# Patient Record
Sex: FEMALE | Race: OTHER | Employment: OTHER | URBAN - METROPOLITAN AREA
[De-identification: names, ages, dates, MRNs, and addresses within clinical notes are randomized per-mention and may not be internally consistent; named-entity substitution may affect disease eponyms.]

---

## 2021-08-16 ENCOUNTER — APPOINTMENT (OUTPATIENT)
Dept: PHYSICAL THERAPY | Age: 46
End: 2021-08-16

## 2021-08-18 ENCOUNTER — APPOINTMENT (OUTPATIENT)
Dept: PHYSICAL THERAPY | Age: 46
End: 2021-08-18

## 2021-08-30 ENCOUNTER — HOSPITAL ENCOUNTER (OUTPATIENT)
Dept: PHYSICAL THERAPY | Age: 46
Discharge: HOME | End: 2021-08-30
Payer: COMMERCIAL

## 2021-08-30 PROCEDURE — 97110 THERAPEUTIC EXERCISES: CPT

## 2021-08-30 PROCEDURE — 97014 ELECTRIC STIMULATION THERAPY: CPT

## 2021-08-30 PROCEDURE — 97162 PT EVAL MOD COMPLEX 30 MIN: CPT

## 2021-08-30 NOTE — PROGRESS NOTES
PHYSICAL THERAPY - DAILY TREATMENT NOTE    Patient Name: Jennifer Garcia        Date: 2021  : 1975   yes Patient  Verified  Visit #:   1     Insurance: Payor: Billy Drivers / Plan: Michael Finn HMO / Product Type: HMO /      In time: 11:31 Out time: 12:39   Total Treatment Time: 68     Medicare/[facility] Time Tracking (below)   Total Timed Codes (min):  na 1:1 Treatment Time:  na     TREATMENT AREA =  Low back pain [M54.5]    SUBJECTIVE  Pain Level (on 0 to 10 scale):  4  / 10   Medication Changes/New allergies or changes in medical history, any new surgeries or procedures?    no  If yes, update Summary List   Subjective Functional Status/Changes:  []  No changes reported     See POC          OBJECTIVE  Modalities Rationale:     decrease inflammation and decrease pain to improve patient's ability to complete ADLs, transfers  10 min [x] Estim, type/location: IFC to lumbosacral junction in supine w/ bolster                                     []  att     [x]  unatt     []  w/US     []  w/ice    []  w/heat    min []  Mechanical Traction: type/lbs                   []  pro   []  sup   []  int   []  cont    []  before manual    []  after manual    min []  Ultrasound, settings/location:      min []  Iontophoresis w/ dexamethasone, location:                                               []  take home patch       []  in clinic    min []  Ice     []  Heat    location/position:     min []  Vasopneumatic Device, press/temp:    If using vaso (only need to measure limb vaso being performed on)      pre-treatment girth :       post-treatment girth :       measured at (landmark location) :      min []  Other:    [] Skin assessment post-treatment (if applicable):    []  intact    []  redness- no adverse reaction                  []redness  adverse reaction:      8 min Therapeutic Exercise:  [x]  See flow sheet   Rationale:      increase ROM and increase strength to improve the patients ability to ambulate, transfer Billed With/As:   [x] TE   [] TA   [] Neuro   [] Self Care Patient Education: [x] Review HEP    [] Progressed/Changed HEP based on:   [] positioning   [] body mechanics   [] transfers   [] heat/ice application    [] other:      Other Objective/Functional Measures:    See POC     Post Treatment Pain Level (on 0 to 10) scale:   6  / 10     ASSESSMENT  Assessment/Changes in Function:     Pt reported inc pain following objective portion of evaluation (8/10); trialed IFC to to lumbosacral junction for pain modulation, which brought patients pain down 2 points on VAS. Will attempt to obtain home unit for long-term pain modulation. []  See Progress Note/Recertification   Patient will continue to benefit from skilled PT services to modify and progress therapeutic interventions, address functional mobility deficits, address ROM deficits, address strength deficits, analyze and address soft tissue restrictions, analyze and cue movement patterns, analyze and modify body mechanics/ergonomics, assess and modify postural abnormalities and instruct in home and community integration to attain remaining goals.    Progress toward goals / Updated goals:    See POC     PLAN  []  Upgrade activities as tolerated yes Continue plan of care   []  Discharge due to :    []  Other:      Therapist: Roland Shah PT    Date: 8/30/2021 Time: 11:04 AM     Future Appointments   Date Time Provider Michael Raymond   8/30/2021 11:30 AM Sean Cardenas PT [facility]

## 2021-08-30 NOTE — PROGRESS NOTES
[id number] AT [address], [address] [place] [address] Road  Phone: [phone]   [phone]  PLAN OF CARE / [address] [place] [address] PHYSICAL THERAPY SERVICES  Patient Name: Radha Mccurdy : 1975   Medical   Diagnosis: S/p L5/S1 ALIF Treatment Diagnosis: Low back pain [M54.5]   Onset Date: DOS: 2021     Referral Source: Berhane Hammer MD Start of Care [facility]): 2021   Prior Hospitalization: See medical history Provider #: [id number]   Prior Level of Function: 2 story home (15 steps to bedroom); previously able to walk unlimited, play softball   Comorbidities: DM, B PN;  x 2, hysterectomy; hyperthyroid, cholecystectomy; sleep dysfunction, depression 2   Medications: Verified on Patient Summary List   The Plan of Care and following information is based on the information from the initial evaluation.   ===========================================================================================  Assessment / key information: Pt is a 55 y/o F who presents to PT w/ c/o low back pain/stiffness s/p L5/S1 ALIF in 2021. Pt notes 2 year history of LBP which she relates [place]. Pt reports she eventually saw spine surgeon, who advised surgical intervention. Pt notes pre-operatively she experienced L thigh sx, which she reports he resolved except for when sleeping at night. Pt reports post-op course was complicated due to sepsis and was bedridden x 7 days. Reports she has not had any PT to date. Was rx back brace but was unable to wear 2' wound-vac x 9 weeks. Pt notes pain ranges 6 to 9/10, made worse with movement; better with icy-hot ointment and laying supine or on the R side. Describes pain as constant stiffness and achiness, and intermittent sharpness. Pt also endorses deconditioning/endurance. Functional limitations include difficulty cleaning/house work, walking up stairs, walking > 100 yards.  Prior treatment has included injections which she reports provided no relief. Red flags negative. FOTO 41/100. Clinical Exam Findings:  POSTURE/OBSERVATION: absent lumbar lordosis in stand. Scarred, longitudinal incision on L lower abdomen. FUNCTIONAL ASSESSMENT: gait dec trunk rotation, otherwise unrmerkable; sit<>stand slowed to ascend and inc time to stand upright,; stairs NR with B HR (ascends leading R, descends leading L); heel walk/toe walk normal; squat normal; SLS R 30\"/L 30\" w/ inc ankle strategy; bed mobility independent with good mechanics supine<>sit; bridge 75% AROM. ROM: lumbar AROM flex to distal thigh p!, ext minimal curve reversal p!, R SB distal thigh, L SB GT p!, RR 50%, LR 10% p!; hip ER R 35/L 30 p!, hip IR R 26/L 25 p!, hip flex R 105/L 95 p!, hip ext R 6/L 0 p! STRENGTH: ankle DF R 5/5/L 4+/5; EV R 5/5/L 4+/5; PF R 5/5/L 5/5; knee flex R 5/5/L 4-/5 p!; knee ext R 5/5/L 4-/5p!; hip flex R 5/5/ L 4/5 p!; abd R 5/5/L 4/5; ER R 5/5/ L 4+/5, IR R 4/5/L 4/5; ext R 4-/5/L 3+/5 p!; TA isometric poor. PALPATION: hyposensitivity noted to L L3 dermatome. TTP to L>R piriformis, glute max/med, L4/5 spinous process, L sacral base. SPECIAL TEST: (-) slump, (-) SLR. (-) diastasis recti testing. Pt presents w/ sx suggesting/consistent with lumbago and deconditioning s/p lumbar ALIF. Pt could benefit from PT to address impairments and functional limitations in order to improve pt's ability to complete ADLs.  Due to chronicity of condition, patient would benefit from home e-stim unit for pain modulation to improve activity tolerance and decrease reliance on pain medication.  ===========================================================================================  Eval Complexity: History MEDIUM  Complexity : 1-2 comorbidities / personal factors will impact the outcome/ POC ;  Examination  MEDIUM Complexity : 3 Standardized tests and measures addressing body structure, function, activity limitation and / or participation in recreation ; Presentation MEDIUM Complexity : Evolving with changing characteristics ; Decision Making MEDIUM Complexity : FOTO score of 26-74; Overall Complexity MEDIUM  Problem List: pain affecting function, decrease ROM, decrease strength, impaired gait/ balance, decrease ADL/ functional abilitiies, decrease activity tolerance, decrease flexibility/ joint mobility and decrease transfer abilities   Treatment Plan may include any combination of the following: Therapeutic exercise, Therapeutic activities, Neuromuscular re-education, Physical agent/modality, Gait/balance training, Manual therapy, Patient education, Self Care training, Functional mobility training, Home safety training and Stair training  Patient / Family readiness to learn indicated by: asking questions, trying to perform skills and interest  Persons(s) to be included in education: patient (P)  Barriers to Learning/Limitations: None  Measures taken, if barriers to learning:    Patient Goal (s): \"to gain my motion of movement back and with no pain or discomfort\"   Patient self reported health status: good  Rehabilitation Potential: good   Short Term Goals: To be accomplished in  1 weeks:  1. Pt will be I and compliant with HEP for self management of sx  2. Pt will demo good TA engagement without compensation to improve axial stability for ADLs   Long Term Goals: To be accomplished in  4-6  weeks:  1. Improve lumbar flexion AROM to at least mid shin to improve ease of transfers, dressing  2. Improve L quad/HS strength to at least 4/5 to improve ability to negotiate stairs w/ reciprocal pattern  3. Dec max pain </= 4/10 to improve activity tolerance  4. Improve FOTO score to >/= 53/100 to indicate improved function    Frequency / Duration:   Patient to be seen  2  times per week for 4-6  weeks:   All LTG as above will be assessed and updated every 10 visits or 30 days and progressed as needed    Patient / Caregiver education and instruction: activity modification and exercises  Therapist Signature: [facility],  Date: 1/27/5088   Certification Period: na Time: 11:04 AM   ===========================================================================================  I certify that the above Physical Therapy Services are being furnished while the patient is under my care. I agree with the treatment plan and certify that this therapy is necessary. Physician Signature:        Date:       Time:                         Juli Olivo MD  Please sign and return to [facility] or you may fax the signed copy to [phone]. Thank you.

## 2021-08-31 ENCOUNTER — HOSPITAL ENCOUNTER (OUTPATIENT)
Dept: PHYSICAL THERAPY | Age: 46
Discharge: HOME | End: 2021-08-31
Payer: COMMERCIAL

## 2021-08-31 PROCEDURE — 97110 THERAPEUTIC EXERCISES: CPT

## 2021-08-31 PROCEDURE — 97014 ELECTRIC STIMULATION THERAPY: CPT

## 2021-08-31 NOTE — PROGRESS NOTES
PHYSICAL THERAPY - DAILY TREATMENT NOTE    Patient Name: Yimi Ludwig        Date: 2021  : 1975   yes Patient  Verified  Visit #:   2   of     Insurance: Payor: Marion Enrique / Plan: Cherrie Jessica HMO / Product Type: HMO /      In time: 2:00 Out time: 3:10   Total Treatment Time: 70     Medicare/[employer] Time Tracking (below)   Total Timed Codes (min):   1:1 Treatment Time:       TREATMENT AREA =  Low back pain [M54.5]    SUBJECTIVE  Pain Level (on 0 to 10 scale):  6  / 10   Medication Changes/New allergies or changes in medical history, any new surgeries or procedures?    no  If yes, update Summary List   Subjective Functional Status/Changes:  []  No changes reported   I usually feel most of the pain in the lower left side of my back and my left leg feels a lot weaker than my right leg.            OBJECTIVE  Modalities Rationale:     decrease inflammation and decrease pain to improve patient's ability to improve functional abilities   10 min [x] Estim, type/location: IFC/L lumbar quadrant                                      []  att     []  unatt     []  w/US     []  w/ice    []  w/heat    min []  Mechanical Traction: type/lbs                   []  pro   []  sup   []  int   []  cont    []  before manual    []  after manual    min []  Ultrasound, settings/location:      min []  Iontophoresis w/ dexamethasone, location:                                               []  take home patch       []  in clinic    min []  Ice     []  Heat    location/position:     min []  Vasopneumatic Device, press/temp:    If using vaso (only need to measure limb vaso being performed on)      pre-treatment girth :       post-treatment girth :       measured at (landmark location) :      min []  Other:    [] Skin assessment post-treatment (if applicable):    []  intact    []  redness- no adverse reaction                  []redness  adverse reaction:      60 min Therapeutic Exercise:  [x]  See flow sheet   Rationale:      increase ROM, increase strength, improve balance and increase proprioception to improve the patients ability to improve functional abilities        Billed With/As:   [] TE   [] TA   [] Neuro   [] Self Care Patient Education: [x] Review HEP    [] Progressed/Changed HEP based on:   [] positioning   [] body mechanics   [] transfers   [] heat/ice application    [] other:      Other Objective/Functional Measures:       Post Treatment Pain Level (on 0 to 10) scale:   6 / 10     ASSESSMENT  Assessment/Changes in Function:   Pt presented with chief c/o L>R lumbar quadrant pain/symptoms as well as decreased L LE strength/endurance. Pt was challenged the most with L LE strength and endurance with SLR's and sidelying clamshells even with limited repetitions. Will continue to progress/advance patient within current POC as tolerated with monitoring symptoms. []  See Progress Note/Recertification   Patient will continue to benefit from skilled PT services to modify and progress therapeutic interventions, address functional mobility deficits, address ROM deficits, address strength deficits, analyze and address soft tissue restrictions, analyze and cue movement patterns, analyze and modify body mechanics/ergonomics, assess and modify postural abnormalities and instruct in home and community integration to attain remaining goals. Progress toward goals / Updated goals: · Short Term Goals: To be accomplished in  1 weeks:  1. Pt will be I and compliant with HEP for self management of sx: 8/31/21: Met  2. Pt will demo good TA engagement without compensation to improve axial stability for ADLs  · Long Term Goals: To be accomplished in  4-6  weeks:  1. Improve lumbar flexion AROM to at least mid shin to improve ease of transfers, dressing  2. Improve L quad/HS strength to at least 4/5 to improve ability to negotiate stairs w/ reciprocal pattern  3. Dec max pain </= 4/10 to improve activity tolerance  4.  Improve FOTO score to >/= 53/100 to indicate improved function        PLAN  [x]  Upgrade activities as tolerated yes Continue plan of care   []  Discharge due to :    []  Other:      Therapist: Conrad Guzman PTA    Date: 8/31/2021 Time: 2:12 PM     Future Appointments   Date Time Provider Michael Raymond   9/2/2021  2:45 PM Kenji Lacy [facility]   9/16/2021  2:00 PM Jolene Cruz [facility]   9/20/2021  1:00 PM Bautista Meigs, PTA [facility]   9/22/2021  1:30 PM Kenji Lacy [facility]   9/27/2021  1:00 PM Bautista Meigs, PTA [facility]   9/29/2021  1:30 PM Christian Dyke [address] [facility]   10/4/2021  1:00 PM Bautista Meigs, PTA [address] [facility]   10/6/2021  1:30 PM Christian Dyke [address] [facility]   10/11/2021  1:00 PM Bautista Meigs, PTA [facility]   10/13/2021  1:30 PM Christian Dyke [facility]

## 2021-09-02 ENCOUNTER — HOSPITAL ENCOUNTER (OUTPATIENT)
Dept: PHYSICAL THERAPY | Age: 46
Discharge: HOME | End: 2021-09-02
Payer: COMMERCIAL

## 2021-09-02 PROCEDURE — 97110 THERAPEUTIC EXERCISES: CPT

## 2021-09-02 PROCEDURE — 97014 ELECTRIC STIMULATION THERAPY: CPT

## 2021-09-02 NOTE — PROGRESS NOTES
PHYSICAL THERAPY - DAILY TREATMENT NOTE    Patient Name: Barbara Harris        Date: 2021  : 1975   yes Patient  Verified  Visit #:   3   of     Insurance: Payor: Curt Cline / Plan: [employer] Villareal HMO / Product Type: HMO /      In time: 245p Out time: 346p   Total Treatment Time: 61     Medicare/[employer] Time Tracking (below)   Total Timed Codes (min):  46 1:1 Treatment Time:  46     TREATMENT AREA =  Low back pain [M54.5]    SUBJECTIVE  Pain Level (on 0 to 10 scale):  8  / 10   Medication Changes/New allergies or changes in medical history, any new surgeries or procedures?    no  If yes, update Summary List   Subjective Functional Status/Changes:  []  No changes reported   Pt reports she is having inc pain today from inc movement.  She tried to do some stretches this AM but they didn't seem to help, she has been trying to not take the pain medication       OBJECTIVE  Modalities Rationale:     decrease inflammation and decrease pain to improve patient's ability to improve functional abilities   15 min [x] Estim, type/location: IFC/L lumbar quadrant                                      []  att     []  unatt     []  w/US     [x]  w/ice    []  w/heat    min []  Mechanical Traction: type/lbs                   []  pro   []  sup   []  int   []  cont    []  before manual    []  after manual    min []  Ultrasound, settings/location:      min []  Iontophoresis w/ dexamethasone, location:                                               []  take home patch       []  in clinic    min []  Ice     []  Heat    location/position:     min []  Vasopneumatic Device, press/temp:    If using vaso (only need to measure limb vaso being performed on)      pre-treatment girth :       post-treatment girth :       measured at (landmark location) :      min []  Other:    [] Skin assessment post-treatment (if applicable):    []  intact    []  redness- no adverse reaction                  []redness  adverse reaction:      46 min Therapeutic Exercise:  [x]  See flow sheet   Rationale:      increase ROM, increase strength, improve balance and increase proprioception to improve the patients ability to improve functional abilities        Billed With/As:   [] TE   [] TA   [] Neuro   [] Self Care Patient Education: [x] Review HEP    [] Progressed/Changed HEP based on:   [] positioning   [] body mechanics   [] transfers   [] heat/ice application    [] other:      Other Objective/Functional Measures:  Continued with exercises from last visit  Added sit to stand with band and post.      Post Treatment Pain Level (on 0 to 10) scale:   6  / 10     ASSESSMENT  Assessment/Changes in Function:   Pt was instructed in program to tolerance today due to report of inc baseline sx. PT relayed info from [facility] re lumbar brace, and also that [facility] would contact pt directly re coverage with home TENS unit. Pt reported she already has lumbar brace at home. Pt was able to tolerate program without inc sx in clinic today. Pt was educated to continue to ice at home as needed to aid in pain management. Pt was instructed in sit to stands with band and post today to improve activation of glutes and hip hinging for lumbar postural control. []  See Progress Note/Recertification   Patient will continue to benefit from skilled PT services to modify and progress therapeutic interventions, address functional mobility deficits, address ROM deficits, address strength deficits, analyze and address soft tissue restrictions, analyze and cue movement patterns, analyze and modify body mechanics/ergonomics, assess and modify postural abnormalities and instruct in home and community integration to attain remaining goals. Progress toward goals / Updated goals: · Short Term Goals: To be accomplished in  1 weeks:  1. Pt will be I and compliant with HEP for self management of sx: 8/31/21: Met  2.  Pt will demo good TA engagement without compensation to improve axial stability for ADLs  · Long Term Goals: To be accomplished in  4-6  weeks:  1. Improve lumbar flexion AROM to at least mid shin to improve ease of transfers, dressing  2. Improve L quad/HS strength to at least 4/5 to improve ability to negotiate stairs w/ reciprocal pattern  3. Dec max pain </= 4/10 to improve activity tolerance  4.  Improve FOTO score to >/= 53/100 to indicate improved function        PLAN  [x]  Upgrade activities as tolerated yes Continue plan of care   []  Discharge due to :    []  Other:      Therapist: Taylor Ahumada    Date: 9/2/2021 Time: 2:12 PM     Future Appointments   Date Time Provider Michael Raymond   9/2/2021  2:45 PM Hay Lacy [facility]   9/16/2021  2:00 PM Ruthe Kawasaki [facility]   9/20/2021  1:00 PM Emi Bowen, PTA [facility]   9/22/2021  1:30 PM Hay Lacy [facility]   9/27/2021  1:00 PM Emi Bowen, PTA [facility]   9/29/2021  1:30 PM Deepak Kandiyohi [address] [facility]   10/4/2021  1:00 PM Emi Bowen, PTA [address] [facility]   10/6/2021  1:30 PM Deepak Kandiyohi [address] [facility]   10/11/2021  1:00 PM Emi Bowen, PTA [facility]   10/13/2021  1:30 PM Ruthe Kawasaki [facility]

## 2021-09-16 ENCOUNTER — APPOINTMENT (OUTPATIENT)
Dept: PHYSICAL THERAPY | Age: 46
End: 2021-09-16
Payer: COMMERCIAL

## 2021-09-20 ENCOUNTER — APPOINTMENT (OUTPATIENT)
Dept: PHYSICAL THERAPY | Age: 46
End: 2021-09-20
Payer: COMMERCIAL

## 2021-09-22 ENCOUNTER — HOSPITAL ENCOUNTER (OUTPATIENT)
Dept: PHYSICAL THERAPY | Age: 46
End: 2021-09-22
Payer: COMMERCIAL

## 2021-09-27 ENCOUNTER — APPOINTMENT (OUTPATIENT)
Dept: PHYSICAL THERAPY | Age: 46
End: 2021-09-27
Payer: COMMERCIAL

## 2021-09-29 ENCOUNTER — APPOINTMENT (OUTPATIENT)
Dept: PHYSICAL THERAPY | Age: 46
End: 2021-09-29
Payer: COMMERCIAL

## 2021-10-04 ENCOUNTER — APPOINTMENT (OUTPATIENT)
Dept: PHYSICAL THERAPY | Age: 46
End: 2021-10-04
Payer: COMMERCIAL

## 2021-10-06 ENCOUNTER — APPOINTMENT (OUTPATIENT)
Dept: PHYSICAL THERAPY | Age: 46
End: 2021-10-06
Payer: COMMERCIAL

## 2021-10-11 ENCOUNTER — HOSPITAL ENCOUNTER (OUTPATIENT)
Dept: PHYSICAL THERAPY | Age: 46
Discharge: HOME | End: 2021-10-11
Payer: COMMERCIAL

## 2021-10-11 PROCEDURE — 97110 THERAPEUTIC EXERCISES: CPT

## 2021-10-11 NOTE — PROGRESS NOTES
PHYSICAL THERAPY - DAILY TREATMENT NOTE    Patient Name: Gage Torres        Date: 10/11/2021  : 1975   yes Patient  Verified  Visit #:   4     Insurance: Payor: [place] / Plan: [address] HMO / Product Type: HMO /      In time: 1:03 Out time: 2:09   Total Treatment Time: 66     Medicare/BCBS Time Tracking (below)   Total Timed Codes (min):   1:1 Treatment Time:       TREATMENT AREA =  Low back pain [M54.5]    SUBJECTIVE  Pain Level (on 0 to 10 scale):  5  / 10   Medication Changes/New allergies or changes in medical history, any new surgeries or procedures?    no  If yes, update Summary List   Subjective Functional Status/Changes:  []  No changes reported     I have been experiencing a lot more pain and complications especially when I do alot of standing and/or walking, but I am still waiting for the doctor to call me back about the MRI results.            OBJECTIVE  Modalities Rationale:     decrease pain and increase tissue extensibility to improve patient's ability to improve functional abilities    min [] Estim, type/location:                                      []  att     []  unatt     []  w/US     []  w/ice    []  w/heat    min []  Mechanical Traction: type/lbs                   []  pro   []  sup   []  int   []  cont    []  before manual    []  after manual    min []  Ultrasound, settings/location:      min []  Iontophoresis w/ dexamethasone, location:                                               []  take home patch       []  in clinic   10 min []  Ice     [x]  Heat    location/position: Lumbar/long sitting     min []  Vasopneumatic Device, press/temp:    If using vaso (only need to measure limb vaso being performed on)      pre-treatment girth :       post-treatment girth :       measured at (landmark location) :    Vasopnuematic compression justification:  Per bilateral girth measures taken and listed above the edema is considered significant and having an impact on the patient's min []  Other:    [] Skin assessment post-treatment (if applicable):    []  intact    []  redness- no adverse reaction                  []redness  adverse reaction:      56 min Therapeutic Exercise:  [x]  See flow sheet   Rationale:      increase ROM, increase strength, improve balance and increase proprioception to improve the patients ability to improve functional abilities        Billed With/As:   [] TE   [] TA   [] Neuro   [] Self Care Patient Education: [x] Review HEP    [] Progressed/Changed HEP based on:   [] positioning   [] body mechanics   [] transfers   [] heat/ice application    [] other:      Other Objective/Functional Measures:       Post Treatment Pain Level (on 0 to 10) scale:   0  / 10     ASSESSMENT  Assessment/Changes in Function:   See Progress note/Physician update for full detailed progress towards established goals. [x]  See Progress Note/Recertification   Patient will continue to benefit from skilled PT services to modify and progress therapeutic interventions, address functional mobility deficits, address ROM deficits, address strength deficits, analyze and address soft tissue restrictions, analyze and cue movement patterns, analyze and modify body mechanics/ergonomics, assess and modify postural abnormalities and instruct in home and community integration to attain remaining goals. Progress toward goals / Updated goals:  See Progress note/Physician update for full detailed progress towards established goals.      PLAN  [x]  Upgrade activities as tolerated yes Continue plan of care   []  Discharge due to :    []  Other:      Therapist: Kenji Moncada PTA    Date: 10/11/2021 Time: 1:01 PM     Future Appointments   Date Time Provider Michael Raymond   10/13/2021  1:30 PM Jonatan Moreira [address]

## 2021-10-11 NOTE — PROGRESS NOTES
[facility] PHYSICAL THERAPY  [address], [place], [address] - Phone: [phone]  Fax: [phone]  PROGRESS NOTE  Patient Name: Leah Orantes : 1975   Treatment/Medical Diagnosis: Low back pain [M54.5]   Referral Source: Pily Ramirez MD     Date of Initial Visit: 21 Attended Visits: 4 Missed Visits:      SUMMARY OF TREATMENT  Therapeutic exercise for lumbar/LE flexibility, postural awareness/strengthening, lumbopelvic/core stabilization, manual intervention, electrical stimulation with moist heat and HEP. CURRENT STATUS  Pt reports 2% overall improvement in sx since beginning care. Pt reports 9/10 max pain, 8/10 avg pain. Pain made worse with any amount of standing/walking even short term as well as prolonged sitting >35 minutes. Pt has made minimal progress due to just returning to therapy after > 5 week leave of absence due to to being out of town to deal with personal and family matters. Pt however reports that she should be able to stay on track with increased consistency with attendance from here on out for the remainder of her follow up appointments. Improvements: Pt reports the most functional improvement with the ability to some what manage her pain with HEP as well as taught pain relief techniques and using her TENS unit which was obtained last week. Remaining functional limitations: Increased limitations/pain/symptoms with any amount of standing/walking even short term as well as prolonged sitting >35 minutes. Objective Measurements:  Lumbar AROM= Flexion= 50% (finger tips to tibial tuberosity); Extension= 40%; Side bending= R= 80% p!; L= full/WFL's   L LE strength measurements/MMT= Quads= 4+/5; Hamstrings= 4/5    FOTO 42/100    Goal/Measure of Progress Goal Met? 1.   Pt will be I and compliant with HEP for self management of sx   Status at last Eval: Issued and reviewed Current Status: Pt reportsindependence and compliance with current HEP yes   2. Pt will demo good TA engagement without compensation to improve axial stability for ADLs   Status at last Eval: TA isometric poor Current Status: TA isometric good yes   3. Improve lumbar flexion AROM to at least mid shin to improve ease of transfers, dressing   Status at last Eval: lumbar AROM flex to distal thigh p!,  Current Status: lumbar AROM flex to tibial tuberosity p! progress     Goal/Measure of Progress Goal Met? 4. Improve L quad/HS strength to at least 4/5 to improve ability to negotiate stairs w/ reciprocal pattern   Status at last Eval: L Quads and Hamstrings= 4-/5 with pain while testing  Current Status: L Quads= 4+/5; Hamstrings= 4/5 yes   5. Dec max pain </= 4/10 to improve activity tolerance   Status at last Eval: 9/10 Current Status: 9/10 no   6. Improve FOTO score to >/= 53/100 to indicate improved function   Status at last Eval: 41/100 Current Status: 42/100 progress     New Goals to be achieved in __8__  treatments:  1. Improve lumbar flexion AROM to at least mid shin to improve ease of transfers, dressing  2. Dec max pain </= 4/10 to improve activity tolerance  3. Improve FOTO score to >/= 53/100 to indicate improved function    RECOMMENDATIONS  Continue with current POC for 8 remaining visits left on current script with advancing as tolerated, then reassess for the need for continuation or discharge from therapy. Pt advised need for consistency in attendance in order to progress through PT; pt acknowledges understanding. If you have any questions/comments please contact us directly at [phone]. Thank you for allowing us to assist in the care of your patient.     Therapist Signature: Leydi Osei PTA Date: 10/11/2021    Elena Palacios PT, DPT, CMTPT Time: 1:13 PM   NOTE TO PHYSICIAN:  PLEASE COMPLETE THE ORDERS BELOW AND FAX TO   [facility]: [phone]  If you are unable to process this request in 24 hours please contact our office: [phone]    ___ I have read the above report and request that my patient continue as recommended.   ___ I have read the above report and request that my patient continue therapy with the following changes/special instructions:_________________________________________________________   ___ I have read the above report and request that my patient be discharged from therapy.      Physician Signature:        Date:       Time:       Jeff Alvarez MD

## 2021-10-13 ENCOUNTER — APPOINTMENT (OUTPATIENT)
Dept: PHYSICAL THERAPY | Age: 46
End: 2021-10-13
Payer: COMMERCIAL

## 2021-10-29 ENCOUNTER — HOSPITAL ENCOUNTER (OUTPATIENT)
Dept: PHYSICAL THERAPY | Age: 46
Discharge: HOME | End: 2021-10-29
Payer: COMMERCIAL

## 2021-10-29 PROCEDURE — 97110 THERAPEUTIC EXERCISES: CPT

## 2021-10-29 PROCEDURE — 97140 MANUAL THERAPY 1/> REGIONS: CPT

## 2021-10-29 NOTE — PROGRESS NOTES
PHYSICAL THERAPY - DAILY TREATMENT NOTE    Patient Name: Faye Grant        Date: 10/29/2021  : 1975   yes Patient  Verified  Visit #:   5     Insurance: Payor: [employer] / Plan: [employer] HMO / Product Type: HMO /      In time: 1:16 Out time: 2:36   Total Treatment Time: 80     Medicare/BCBS Time Tracking (below)   Total Timed Codes (min):   1:1 Treatment Time:       TREATMENT AREA =  Other low back pain [M54.59]    SUBJECTIVE  Pain Level (on 0 to 10 scale):  6  / 10   Medication Changes/New allergies or changes in medical history, any new surgeries or procedures?    no  If yes, update Summary List   Subjective Functional Status/Changes:  []  No changes reported     I found out that I had a kidney infection when I went to the doctor yesterday which further complicated things lately.           OBJECTIVE  Modalities Rationale:     decrease pain and increase tissue extensibility to improve patient's ability to improve functional abilities    min [] Estim, type/location:                                      []  att     []  unatt     []  w/US     []  w/ice    []  w/heat    min []  Mechanical Traction: type/lbs                   []  pro   []  sup   []  int   []  cont    []  before manual    []  after manual    min []  Ultrasound, settings/location:      min []  Iontophoresis w/ dexamethasone, location:                                               []  take home patch       []  in clinic   10 min []  Ice     [x]  Heat    location/position: Lumbar/prone     min []  Vasopneumatic Device, press/temp:    If using vaso (only need to measure limb vaso being performed on)      pre-treatment girth :       post-treatment girth :       measured at (landmark location) :    Vasopnuematic compression justification:  Per bilateral girth measures taken and listed above the edema is considered significant and having an impact on the patient's     min []  Other:    [] Skin assessment post-treatment (if applicable): []  intact    []  redness- no adverse reaction                  []redness  adverse reaction:      60 min Therapeutic Exercise:  [x]  See flow sheet   Rationale:      increase ROM, increase strength, improve balance and increase proprioception to improve the patients ability to improve functional abilities      10 min Manual Therapy: STM/tissue mobs to bilateral lumbar quadrants in prone   Rationale:      decrease pain, increase ROM, increase tissue extensibility, decrease trigger points and increase postural awareness to improve patient's ability to improve functional mobility   The manual therapy interventions were performed at a separate and distinct time from the therapeutic activities interventions. Billed With/As:   [] TE   [] TA   [] Neuro   [] Self Care Patient Education: [x] Review HEP    [] Progressed/Changed HEP based on:   [] positioning   [] body mechanics   [] transfers   [] heat/ice application    [] other:      Other Objective/Functional Measures: Addition of level 1 dead bug stabs to program today     Post Treatment Pain Level (on 0 to 10) scale:   3  / 10     ASSESSMENT  Assessment/Changes in Function:   Pt presented with chief c/o increased L lumbar quadrant and LE pain from recent diagnosed kidney infection since last treatment, but was able to tolerate full normal therex regiment including addition of level 1 dead bug stabs and increased resistance with sidelying clamshells with moderate challenge today. Will continue to progress/advance patient within current POC as tolerated with monitoring symptoms.      []  See Progress Note/Recertification   Patient will continue to benefit from skilled PT services to modify and progress therapeutic interventions, address functional mobility deficits, address ROM deficits, address strength deficits, analyze and address soft tissue restrictions, analyze and cue movement patterns, analyze and modify body mechanics/ergonomics, assess and modify postural abnormalities and instruct in home and community integration to attain remaining goals. Progress toward goals / Updated goals:  New Goals to be achieved in __8__  treatments:  1. Improve lumbar flexion AROM to at least mid shin to improve ease of transfers, dressing  2. Dec max pain </= 4/10 to improve activity tolerance 10/29/21: Not Met, 6/10 max pain level since last treatment   3.  Improve FOTO score to >/= 53/100 to indicate improved function     PLAN  [x]  Upgrade activities as tolerated yes Continue plan of care   []  Discharge due to :    []  Other:      Therapist: Kenji Moncada PTA    Date: 10/29/2021 Time: 1:19 PM     Future Appointments   Date Time Provider Michael Raymond   11/8/2021  1:45 PM Esther Plaza PTA [facility]   11/10/2021  2:15 PM Jonatan Moreira [facility]   11/15/2021  1:00 PM Esther Plaza PTA [facility]   11/17/2021  2:15 PM Esther Plaza PTA [facility]   11/22/2021  2:30 PM Esther Plaza PTA [facility]

## 2021-11-08 ENCOUNTER — APPOINTMENT (OUTPATIENT)
Dept: PHYSICAL THERAPY | Age: 46
End: 2021-11-08
Payer: COMMERCIAL

## 2021-11-08 PROBLEM — K85.90 ACUTE PANCREATITIS: Status: ACTIVE | Noted: 2021-11-08

## 2021-11-10 ENCOUNTER — APPOINTMENT (OUTPATIENT)
Dept: PHYSICAL THERAPY | Age: 46
End: 2021-11-10
Payer: COMMERCIAL

## 2021-11-15 ENCOUNTER — HOSPITAL ENCOUNTER (OUTPATIENT)
Dept: PHYSICAL THERAPY | Age: 46
Discharge: HOME | End: 2021-11-15
Payer: COMMERCIAL

## 2021-11-15 PROCEDURE — 97014 ELECTRIC STIMULATION THERAPY: CPT

## 2021-11-15 PROCEDURE — 97110 THERAPEUTIC EXERCISES: CPT

## 2021-11-15 NOTE — PROGRESS NOTES
[facility] PHYSICAL THERAPY  [facility] [place], [address] - Phone: [phone]  Fax: [phone]  PROGRESS NOTE  Patient Name: Jessie Chavarria : 1975   Treatment/Medical Diagnosis: Other low back pain [M54.59]   Referral Source: Nesha Quevedo MD     Date of Initial Visit: 21 Attended Visits: 6 Missed Visits: 2     SUMMARY OF TREATMENT  Therapeutic exercise for lumbar/LE flexibility, postural awareness/strengthening, lumbopelvic/core stabilization, manual intervention, electrical stimulation with cryotherapy and HEP. CURRENT STATUS  Pt reports 60% overall improvement in sx since beginning care. Pt reports 9/10 max pain, 7/10 avg pain. Pain made worse with sudden rapid movement or change in direction. Pt has made slow but steady progress with gaining lumbar and LE mobility/flexibility as well advancing with strengthening and lumbopelvic/core stabilization within current POC. However, pt's progress has been affected by inconsistent attendance with being hospitalized with pancreatitis since last treatment. Improvements: Pt reports the most functional improvement with increased ambulation tolerance/endurance since initial evaluation. Remaining functional limitations: Increased limitations/pain/symptoms with sudden rapid movement or change in direction. Objective Measurements:  Lumbar AROM= Flexion= 70% (finger tips to distal shin); Extension= 60%; Side bending= full/WFL's bilaterally   L LE strength measurements/MMT= Quads= 4+/5; Hamstrings= 4/5; Gastrocnemius= 4/5    FOTO 48/100    Goal/Measure of Progress Goal Met? 1. Improve lumbar flexion AROM to at least mid shin to improve ease of transfers, dressing   Status at last Eval: lumbar AROM flex to tibial tuberosity  ty p! Current Status: lumbar AROM flex to distal shin yes   2. Dec max pain </= 4/10 to improve activity tolerance   Status at last Eval: 9/10 Current Status: 9/10 no   3. Improve FOTO score to >/= 53/100 to indicate improved function   Status at last Eval: 42/100 Current Status: 48/100 progress     New Goals to be achieved in __6_  treatments:  1. Dec max pain </= 4/10 to improve activity tolerance  2. Improve FOTO score to >/= 53/100 to indicate improved function  3. Improve L hamstring and gastrocnemius strength by =/> 1/3 muscle grade for increased strength and endurance with standing/walking ADL's. RECOMMENDATIONS  Continue with current POC for 6 remaining visits left on current script with advancing as tolerated, then reassess for the need for continuation or discharge from therapy. If you have any questions/comments please contact us directly at [phone]. Thank you for allowing us to assist in the care of your patient. Therapist Signature: Francisco Palomo PTA Date: 11/15/2021    Maximo Hope PT, DPT, CMTPT Time: 1:16 PM   NOTE TO PHYSICIAN:  PLEASE COMPLETE THE ORDERS BELOW AND FAX TO   [facility]: [phone]  If you are unable to process this request in 24 hours please contact our office: [phone]    ___ I have read the above report and request that my patient continue as recommended.   ___ I have read the above report and request that my patient continue therapy with the following changes/special instructions:_________________________________________________________   ___ I have read the above report and request that my patient be discharged from therapy.      Physician Signature:        Date:       Time:       Kana Vargas MD

## 2021-11-15 NOTE — PROGRESS NOTES
PHYSICAL THERAPY - DAILY TREATMENT NOTE    Patient Name: Nael Levin        Date: 11/15/2021  : 1975   yes Patient  Verified  Visit #:   6     Insurance: Payor: Tyler Kimble / Plan: [employer] HMO / Product Type: HMO /      In time: 1:05 Out time: 2:19   Total Treatment Time: 74     Medicare/BCBS Time Tracking (below)   Total Timed Codes (min):   1:1 Treatment Time:       TREATMENT AREA =  Other low back pain [M54.59]    SUBJECTIVE  Pain Level (on 0 to 10 scale):  5   10   Medication Changes/New allergies or changes in medical history, any new surgeries or procedures?    no  If yes, update Summary List   Subjective Functional Status/Changes:  []  No changes reported     My back has been doing better, but my left calf still feels weak and numb on and off with no particular pattern, but it's nothing that is going to keep me from walking. Pt reported having been admitted to the hospital and dx with pancreatitis, she reports she was verbally cleared with her d/c instructions to resume normal activity and Physical Therapy.  Pt advised to provide therapy with d/c instructions for record and advised to modify any aggravating exercises/activities with todays session  Date of admission: 21  Date of discharge: 21        OBJECTIVE  Modalities Rationale:     decrease pain and increase tissue extensibility to improve patient's ability to improve functional abilities   10 min [x] Estim, type/location: IFC/Lumbar                                     []  att     []  unatt     []  w/US     []  w/ice    []  w/heat    min []  Mechanical Traction: type/lbs                   []  pro   []  sup   []  int   []  cont    []  before manual    []  after manual    min []  Ultrasound, settings/location:      min []  Iontophoresis w/ dexamethasone, location:                                               []  take home patch       []  in clinic    min []  Ice     []  Heat    location/position:     min [] Vasopneumatic Device, press/temp:    If using vaso (only need to measure limb vaso being performed on)      pre-treatment girth :       post-treatment girth :       measured at (landmark location) :    Vasopnuematic compression justification:  Per bilateral girth measures taken and listed above the edema is considered significant and having an impact on the patient's     min []  Other:    [] Skin assessment post-treatment (if applicable):    []  intact    []  redness- no adverse reaction                  []redness  adverse reaction:      64 min Therapeutic Exercise:  [x]  See flow sheet   Rationale:      increase ROM, increase strength, improve balance and increase proprioception to improve the patients ability to improve functional abilities        Billed With/As:   [] TE   [] TA   [] Neuro   [] Self Care Patient Education: [x] Review HEP    [] Progressed/Changed HEP based on:   [] positioning   [] body mechanics   [] transfers   [] heat/ice application    [] other:      Other Objective/Functional Measures:         Post Treatment Pain Level (on 0 to 10) scale:   0  / 10     ASSESSMENT  Assessment/Changes in Function:   See Progress note/Physician update for full detailed progress towards established goals. [x]  See Progress Note/Recertification   Patient will continue to benefit from skilled PT services to modify and progress therapeutic interventions, address functional mobility deficits, address ROM deficits, address strength deficits, analyze and address soft tissue restrictions, analyze and cue movement patterns, analyze and modify body mechanics/ergonomics, assess and modify postural abnormalities and instruct in home and community integration to attain remaining goals. Progress toward goals / Updated goals:  See Progress note/Physician update for full detailed progress towards established goals.      PLAN  [x]  Upgrade activities as tolerated yes Continue plan of care   []  Discharge due to :    [] Other:      Therapist: Kenji Moncada PTA    Date: 11/15/2021 Time: 1:03 PM     Future Appointments   Date Time Provider Michael Raymond   11/17/2021  2:15 PM Adriana Fishman [facility]   11/22/2021  2:30 PM Esther Plaza PTA [facility]

## 2021-11-17 ENCOUNTER — APPOINTMENT (OUTPATIENT)
Dept: PHYSICAL THERAPY | Age: 46
End: 2021-11-17
Payer: COMMERCIAL

## 2021-12-08 ENCOUNTER — HOSPITAL ENCOUNTER (OUTPATIENT)
Dept: PHYSICAL THERAPY | Age: 46
Discharge: HOME | End: 2021-12-08
Payer: COMMERCIAL

## 2021-12-08 PROCEDURE — 97110 THERAPEUTIC EXERCISES: CPT

## 2021-12-08 PROCEDURE — 97140 MANUAL THERAPY 1/> REGIONS: CPT

## 2021-12-08 NOTE — PROGRESS NOTES
PHYSICAL THERAPY - DAILY TREATMENT NOTE    Patient Name: Nael Levin        Date: 2021  : 1975   yes Patient  Verified  Visit #:     Insurance: Payor: Tyler Kimble / Plan: [employer] / Product Type: HMO /      In time: 3:47 Out time: 5:03   Total Treatment Time: 76     Medicare/BCBS Time Tracking (below)   Total Timed Codes (min):   1:1 Treatment Time:       TREATMENT AREA =  Other low back pain [M54.59]    SUBJECTIVE  Pain Level (on 0 to 10 scale):  2  / 10   Medication Changes/New allergies or changes in medical history, any new surgeries or procedures?    no  If yes, update Summary List   Subjective Functional Status/Changes:  []  No changes reported     I have been doing better since I was here last, but the tightness and muscle cramps still tend to come and go in my left calf depending on what I am doing.            OBJECTIVE  Modalities Rationale:     decrease pain and increase tissue extensibility to improve patient's ability to improve functional abilities    min [] Estim, type/location:                                      []  att     []  unatt     []  w/US     []  w/ice    []  w/heat    min []  Mechanical Traction: type/lbs                   []  pro   []  sup   []  int   []  cont    []  before manual    []  after manual    min []  Ultrasound, settings/location:      min []  Iontophoresis w/ dexamethasone, location:                                               []  take home patch       []  in clinic   10 min []  Ice     [x]  Heat    location/position: Lumbar/prone    min []  Vasopneumatic Device, press/temp:    If using vaso (only need to measure limb vaso being performed on)      pre-treatment girth :       post-treatment girth :       measured at (landmark location) :    Vasopnuematic compression justification:  Per bilateral girth measures taken and listed above the edema is considered significant and having an impact on the patient's     min []  Other:    [x] Skin assessment post-treatment (if applicable):    [x]  intact    [x]  redness- no adverse reaction                  []redness  adverse reaction:      56   min Therapeutic Exercise:  [x]  See flow sheet bike x 5 mins NB    Rationale:      increase ROM, increase strength, improve balance and increase proprioception to improve the patients ability to improve functional abilities        10 min Manual Therapy: STM/tissue mobs to bilateral lumbar quadrants in prone   Rationale:      decrease pain, increase ROM, increase tissue extensibility, decrease trigger points and increase postural awareness to improve patient's ability to improve functional mobility   The manual therapy interventions were performed at a separate and distinct time from the therapeutic activities interventions. Billed With/As:   [] TE   [] TA   [] Neuro   [] Self Care Patient Education: [x] Review HEP    [] Progressed/Changed HEP based on:   [] positioning   [] body mechanics   [] transfers   [] heat/ice application    [] other:      Other Objective/Functional Measures:  Updated and reviewed HEP with handouts given  Addition of standing squats and mini band side steps to program today     Post Treatment Pain Level (on 0 to 10) scale:   0/ 10     ASSESSMENT  Assessment/Changes in Function:   Pt was able to resume full normal therex regiment after approximately 3 weeks including addition of standing squats and mini band side steps with min to mod challenge today. Pt presented with only c/o intermittent L calf tension/soreness with standing/walking ADL's since last treatment. Will continue to progress/advance patient within current POC as tolerated with monitoring symptoms.      []  See Progress Note/Recertification   Patient will continue to benefit from skilled PT services to modify and progress therapeutic interventions, address functional mobility deficits, address ROM deficits, address strength deficits, analyze and address soft tissue restrictions, analyze and cue movement patterns, analyze and modify body mechanics/ergonomics, assess and modify postural abnormalities and instruct in home and community integration to attain remaining goals. Progress toward goals / Updated goals:  New Goals to be achieved in __6_  treatments:  1. Dec max pain </= 4/10 to improve activity tolerance  2. Improve FOTO score to >/= 53/100 to indicate improved function  3. Improve L hamstring and gastrocnemius strength by =/> 1/3 muscle grade for increased strength and endurance with standing/walking ADL's.       PLAN  [x]  Upgrade activities as tolerated yes Continue plan of care   []  Discharge due to :    []  Other:      Therapist: Marychuy Wiley PTA    Date: 12/8/2021 Time: 3:45 PM     Future Appointments   Date Time Provider Michael Raymond   12/9/2021  2:00 PM Divya Choi [facility]

## 2021-12-09 ENCOUNTER — HOSPITAL ENCOUNTER (OUTPATIENT)
Dept: PHYSICAL THERAPY | Age: 46
Discharge: HOME | End: 2021-12-09
Payer: COMMERCIAL

## 2021-12-09 PROCEDURE — 97110 THERAPEUTIC EXERCISES: CPT

## 2021-12-09 PROCEDURE — 97140 MANUAL THERAPY 1/> REGIONS: CPT

## 2021-12-09 NOTE — PROGRESS NOTES
PHYSICAL THERAPY - DAILY TREATMENT NOTE    Patient Name: Jad Posada        Date: 2021  : 1975   yes Patient  Verified  Visit #:   8     Insurance: Payor: Vena Duane / Plan: [employer] / Product Type: HMO /      In time: 2:00 Out time: 3:12   Total Treatment Time: 72     Medicare/BCBS Time Tracking (below)   Total Timed Codes (min):   1:1 Treatment Time:       TREATMENT AREA =  Other low back pain [M54.59]    SUBJECTIVE  Pain Level (on 0 to 10 scale):  0  / 10   Medication Changes/New allergies or changes in medical history, any new surgeries or procedures?    no  If yes, update Summary List   Subjective Functional Status/Changes:  []  No changes reported     My back and legs have actually been feeling really good since I was here yesterday, I think that those new exercises and stretches that you had me do really helped.            OBJECTIVE  Modalities Rationale:     decrease pain and increase tissue extensibility to improve patient's ability to improve functional abilities    min [] Estim, type/location:                                      []  att     []  unatt     []  w/US     []  w/ice    []  w/heat    min []  Mechanical Traction: type/lbs                   []  pro   []  sup   []  int   []  cont    []  before manual    []  after manual    min []  Ultrasound, settings/location:      min []  Iontophoresis w/ dexamethasone, location:                                               []  take home patch       []  in clinic   10 min []  Ice     [x]  Heat    location/position: Lumbar/prone    min []  Vasopneumatic Device, press/temp:    If using vaso (only need to measure limb vaso being performed on)      pre-treatment girth :       post-treatment girth :       measured at (landmark location) :    Vasopnuematic compression justification:  Per bilateral girth measures taken and listed above the edema is considered significant and having an impact on the patient's     min []  Other:    [x] Skin assessment post-treatment (if applicable):    [x]  intact    [x]  redness- no adverse reaction                  []redness  adverse reaction:      54 min Therapeutic Exercise:  [x]  See flow sheet   Rationale:      increase ROM, increase strength, improve balance and increase proprioception to improve the patients ability to improve functional abilities      8 min Manual Therapy: STM/tissue mobs to L>R lumbar quadrants in prone    Rationale:      decrease pain, increase ROM, increase tissue extensibility, decrease trigger points and increase postural awareness to improve patient's ability to improve functional mobility   The manual therapy interventions were performed at a separate and distinct time from the therapeutic activities interventions. Billed With/As:   [] TE   [] TA   [] Neuro   [] Self Care Patient Education: [x] Review HEP    [] Progressed/Changed HEP based on:   [] positioning   [] body mechanics   [] transfers   [] heat/ice application    [] other:      Other Objective/Functional Measures:  Advanced from level 1 to level 2 dead bug stabs and issued theraband for HEP update today     Post Treatment Pain Level (on 0 to 10) scale:   0  / 10     ASSESSMENT  Assessment/Changes in Function:   Pt reported increased benefit and minimal post exercise soreness after advancing therex regiment last visit. Pt was also able to advance from level 1 to level 2 dead bug stabs with min to mod challenge today. Will review detailed progress/goals for physician update next treatment with continuing to progress/advance within current POC/protocol as tolerated.      []  See Progress Note/Recertification   Patient will continue to benefit from skilled PT services to modify and progress therapeutic interventions, address functional mobility deficits, address ROM deficits, address strength deficits, analyze and address soft tissue restrictions, analyze and cue movement patterns, analyze and modify body mechanics/ergonomics, assess and modify postural abnormalities and instruct in home and community integration to attain remaining goals. Progress toward goals / Updated goals:  New Goals to be achieved in __6_  treatments:  1. Dec max pain </= 4/10 to improve activity tolerance 12/9/21: Progressing, pt reports 5/10 max pain level over the past week  2. Improve FOTO score to >/= 53/100 to indicate improved function  3. Improve L hamstring and gastrocnemius strength by =/> 1/3 muscle grade for increased strength and endurance with standing/walking ADL's.      PLAN  [x]  Upgrade activities as tolerated yes Continue plan of care   []  Discharge due to :    []  Other:      Therapist: Chencho Morales PTA    Date: 12/9/2021 Time: 2:09 PM     No future appointments.

## 2021-12-15 ENCOUNTER — HOSPITAL ENCOUNTER (OUTPATIENT)
Dept: PHYSICAL THERAPY | Age: 46
Discharge: HOME | End: 2021-12-15
Payer: COMMERCIAL

## 2021-12-15 PROCEDURE — 97110 THERAPEUTIC EXERCISES: CPT

## 2021-12-15 PROCEDURE — 97140 MANUAL THERAPY 1/> REGIONS: CPT

## 2021-12-15 PROCEDURE — 97530 THERAPEUTIC ACTIVITIES: CPT

## 2021-12-15 NOTE — PROGRESS NOTES
PHYSICAL THERAPY - DAILY TREATMENT NOTE    Patient Name: Jorje Beckford        Date: 12/15/2021  : 1975   yes Patient  Verified  Visit #:   9     Insurance: Payor: Talib Christianson / Plan: [address] PT / Product Type: Commerical /      In time: 2:16 Out time: 3:30   Total Treatment Time: 74     Medicare/[facility] Time Tracking (below)   Total Timed Codes (min):   1:1 Treatment Time:       TREATMENT AREA =  Other low back pain [M54.59]    SUBJECTIVE  Pain Level (on 0 to 10 scale):  2  / 10   Medication Changes/New allergies or changes in medical history, any new surgeries or procedures?    no  If yes, update Summary List   Subjective Functional Status/Changes:  []  No changes reported     My back and leg has been feeling pretty good, I did go to the batting cage and did it for about 2 minutes and it felt fine, but I didn't over do it. OBJECTIVE    51 min Therapeutic Exercise:  [x]  See flow sheet   Rationale:      increase ROM and increase strength to improve the patients ability to improve functional abilities      8 min Manual Therapy: STM/tissue mobs to bilateral lumbar quadrants L>R   Rationale:      decrease pain, increase ROM, increase tissue extensibility, decrease trigger points and increase postural awareness to improve patient's ability to improve functional mobility   The manual therapy interventions were performed at a separate and distinct time from the therapeutic activities interventions. 15 min Therapeutic Activity: [x]  See flow sheet   Rationale:     Through reassessment of all goals including mobility and strength reassessment for progress note      Billed With/As:   [] TE   [] TA   [] Neuro   [] Self Care Patient Education: [x] Review HEP    [] Progressed/Changed HEP based on:   [] positioning   [] body mechanics   [] transfers   [] heat/ice application    [] other:      Other Objective/Functional Measures:       Post Treatment Pain Level (on 0 to 10) scale:   0  / 10 ASSESSMENT  Assessment/Changes in Function:   See Progress note/Physician update for full detailed progress towards established goals. [x]  See Progress Note/Recertification   Patient will continue to benefit from skilled PT services to modify and progress therapeutic interventions, address functional mobility deficits, address ROM deficits, address strength deficits, analyze and address soft tissue restrictions, analyze and cue movement patterns, analyze and modify body mechanics/ergonomics, assess and modify postural abnormalities and instruct in home and community integration to attain remaining goals. Progress toward goals / Updated goals:  See Progress note/Physician update for full detailed progress towards established goals.      PLAN  [x]  Upgrade activities as tolerated yes Continue plan of care   []  Discharge due to :    []  Other:      Therapist: Patricia Alvares PTA    Date: 12/15/2021 Time: 2:37 PM     Future Appointments   Date Time Provider Michael Raymond   12/17/2021  2:00 PM Shasha Peter [facility]   12/21/2021  2:45 PM Nacho Crisostomo PTA [facility]   12/22/2021  3:00 PM Nacho Crisostomo PTA [facility]

## 2021-12-15 NOTE — PROGRESS NOTES
[facility] PHYSICAL THERAPY  [address], [place], [address] - Phone: [phone]  Fax: [phone]  PROGRESS NOTE  Patient Name: Lawyer Og : 1975   Treatment/Medical Diagnosis: Other low back pain [M54.59]   Referral Source: Rolly Kim MD     Date of Initial Visit: 21 Attended Visits: 9 Missed Visits: 5     SUMMARY OF TREATMENT  Therapeutic exercise for lumbar/LE flexibility, postural awareness/strengthening, lumbopelvic/core stabilization, manual intervention, electrical stimulation with cryotherapy and HEP. CURRENT STATUS  Pt reports 80% overall improvement in sx since beginning care. Pt reports 5/10 max pain, 2/10 avg pain. Pain made worse with intermittently with prolonged walking as well as occasionally with initial activity after getting out of bed in the morning. Pt is making steady progress with gaining lumbar and LE mobility/flexibility as well advancing with strengthening and lumbopelvic/core stabilization within current POC. Pt has also been more consistent with attendance over the past 3 visits. Improvements: Pt reports the most functional improvement withincreased ambulation tolerance/endurance since initial evaluation. Remaining functional limitations: Increased limitations/pain/symptoms with prolonged walking as well as occasionally with initial activity after getting out of bed in the morning. Objective Measurements:  Lumbar AROM= Flexion= 70% (finger tips to distal shin); Extension= 70%; Side bending= full/WFL's bilaterally   L LE strength measurements/MMT= Quads= 5/5; Hamstrings= 4+/5; Gastrocnemius= 4/5    FOTO 65/100    Goal/Measure of Progress Goal Met? 1. Dec max pain </= 4/10 to improve activity tolerance   Status at last Eval: 9/10 Current Status: 5/10 progress   2. Improve FOTO score to >/= 53/100 to indicate improved function   Status at last Eval: 48/100 Current Status: 65/100 yes   3.   Improve L hamstring and gastrocnemius strength by =/> 1/3 muscle grade for increased strength and endurance with standing/walking ADL's. Status at last Eval: Hamstrings= 4/5; Gastrocnemius= 4/5 Current Status: Hamstrings= 4+/5; Gastrocnemius= 4/5 Partially Met     New Goals to be achieved in __6__  treatments:  1. Dec max pain </= 3/10 to improve activity tolerance  2. Pt will improve L gastroc strength evident by ability to perform > 18 SL HR to full range to improve ease of prolonged walking and stair navigation. 3. Pt will report =/> 75% reduction in frequency and intensity of pain/symptoms with prolonged walking and initial activity after getting out of bed in the morning. RECOMMENDATIONS  Continue with current POC for 6 additional visits with advancing as tolerated, then reassess for discharge from therapy as planned/discussed. If you have any questions/comments please contact us directly at [phone]. Thank you for allowing us to assist in the care of your patient. Therapist Signature: Faustino Potter PTA Date: 12/15/2021    Bakari Ramesh PT, DPT, CMTPT Time: 2:42 PM   NOTE TO PHYSICIAN:  PLEASE COMPLETE THE ORDERS BELOW AND FAX TO   [facility]: [phone]  If you are unable to process this request in 24 hours please contact our office: [phone]    ___ I have read the above report and request that my patient continue as recommended.   ___ I have read the above report and request that my patient continue therapy with the following changes/special instructions:_________________________________________________________   ___ I have read the above report and request that my patient be discharged from therapy.      Physician Signature:        Date:       Time:       Loly Bajwa MD

## 2021-12-17 ENCOUNTER — HOSPITAL ENCOUNTER (OUTPATIENT)
Dept: PHYSICAL THERAPY | Age: 46
Discharge: HOME | End: 2021-12-17
Payer: COMMERCIAL

## 2021-12-17 PROCEDURE — 97140 MANUAL THERAPY 1/> REGIONS: CPT

## 2021-12-17 PROCEDURE — 97110 THERAPEUTIC EXERCISES: CPT

## 2021-12-17 NOTE — PROGRESS NOTES
PHYSICAL THERAPY - DAILY TREATMENT NOTE    Patient Name:  Crekatrin        Date: 2021  : 1975   yes Patient  Verified  Visit #:   10  of   15  Insurance: Payor: Zamzam Rabago / Plan: [address] PT / Product Type: Commerical /      In time: 2:00 Out time: 3:05   Total Treatment Time: 65     Medicare/[facility] Time Tracking (below)   Total Timed Codes (min):   1:1 Treatment Time:       TREATMENT AREA =  Other low back pain [M54.59]    SUBJECTIVE  Pain Level (on 0 to 10 scale):  0  / 10   Medication Changes/New allergies or changes in medical history, any new surgeries or procedures?    no  If yes, update Summary List   Subjective Functional Status/Changes:  []  No changes reported     I went for a good long brisk walk for about 20 minutes this morning and I stretched out afterwards and I actually felt pretty good afterwards. OBJECTIVE    55 min Therapeutic Exercise:  [x]  See flow sheet   Rationale:      increase ROM, increase strength, improve balance and increase proprioception to improve the patients ability to improve functional abilities      10 min Manual Therapy: STM to bilateral lumbar quadrants in prone    Rationale:      decrease pain, increase ROM, increase tissue extensibility, decrease trigger points and increase postural awareness to improve patient's ability to improve functional mobility   The manual therapy interventions were performed at a separate and distinct time from the therapeutic activities interventions.     Billed With/As:   [] TE   [] TA   [] Neuro   [] Self Care Patient Education: [x] Review HEP    [] Progressed/Changed HEP based on:   [] positioning   [] body mechanics   [] transfers   [] heat/ice application    [] other:      Other Objective/Functional Measures:  Increased from 2 to 3 lbs with standing hamstring curls and LAQ's as well as increased mini band resistance with clamshells      Post Treatment Pain Level (on 0 to 10) scale:   0  / 10 ASSESSMENT  Assessment/Changes in Function:   Pt presents with the most functional improvement with the ability to increase her distance with self community walking program up to 20 minutes since last treatment. Pt was also able to increase from 2 to 3 lbs with standing hamstring curls and LAQ's as well as increased mini band resistance with clamshells. Pt also presented with good maintenance of core stability with decreased LE height with level 2 dead bug stabs today. Will continue to progress/advance patient within current POC as tolerated with monitoring symptoms. []  See Progress Note/Recertification   Patient will continue to benefit from skilled PT services to modify and progress therapeutic interventions, address functional mobility deficits, address ROM deficits, address strength deficits, analyze and address soft tissue restrictions, analyze and cue movement patterns, analyze and modify body mechanics/ergonomics, assess and modify postural abnormalities and instruct in home and community integration to attain remaining goals. Progress toward goals / Updated goals:  New Goals to be achieved in __6__  treatments:  1. Dec max pain </= 3/10 to improve activity tolerance  2. Pt will improve L gastroc strength evident by ability to perform > 18 SL HR to full range to improve ease of prolonged walking and stair navigation. 3. Pt will report =/> 75% reduction in frequency and intensity of pain/symptoms with prolonged walking and initial activity after getting out of bed in the morning.      PLAN  [x]  Upgrade activities as tolerated yes Continue plan of care   []  Discharge due to :    []  Other:      Therapist: Kenji Moncada PTA    Date: 12/17/2021 Time: 2:13 PM     Future Appointments   Date Time Provider Michael Raymond   12/21/2021  2:45 PM Michael Pena PT [facility] [id number]   12/22/2021  3:00 PM Esther Plaza PTA [facility] [id number]

## 2021-12-22 ENCOUNTER — HOSPITAL ENCOUNTER (OUTPATIENT)
Dept: PHYSICAL THERAPY | Age: 46
Discharge: HOME | End: 2021-12-22
Payer: COMMERCIAL

## 2021-12-22 PROCEDURE — 97110 THERAPEUTIC EXERCISES: CPT

## 2021-12-22 PROCEDURE — 97140 MANUAL THERAPY 1/> REGIONS: CPT

## 2021-12-22 NOTE — PROGRESS NOTES
PHYSICAL THERAPY - DAILY TREATMENT NOTE    Patient Name: Tina Taylor        Date: 2021  : 1975   yes Patient  Verified  Visit #:   11   of   15  Insurance: Payor: Blanco Millan / Plan: [address] / Product Type: Commerical /      In time: 3:01 Out time: 3:57   Total Treatment Time: 56     Medicare/[facility] Time Tracking (below)   Total Timed Codes (min):   1:1 Treatment Time:       TREATMENT AREA =  Other low back pain [M54.59]    SUBJECTIVE  Pain Level (on 0 to 10 scale):  0  / 10   Medication Changes/New allergies or changes in medical history, any new surgeries or procedures?    no  If yes, update Summary List   Subjective Functional Status/Changes:  []  No changes reported     My back is feeling really good today, I have been doing all of my exercises at home since I was here last including using that band that you gave me for the sidesteps. OBJECTIVE    48 min Therapeutic Exercise:  [x]  See flow sheet   Rationale:      increase ROM, increase strength and increase proprioception to improve the patients ability to improve functional abilities      8 min Manual Therapy: STM/tissue mobs to bilateral lumbar quadrants in prone    Rationale:      decrease pain, increase ROM, increase tissue extensibility, decrease trigger points and increase postural awareness to improve patient's ability to improve functional mobility   The manual therapy interventions were performed at a separate and distinct time from the therapeutic activities interventions.     Billed With/As:   [] TE   [] TA   [] Neuro   [] Self Care Patient Education: [x] Review HEP    [] Progressed/Changed HEP based on:   [] positioning   [] body mechanics   [] transfers   [] heat/ice application    [] other:      Other Objective/Functional Measures:  Increased mini band resistance with side steps     Post Treatment Pain Level (on 0 to 10) scale:   0  / 10     ASSESSMENT  Assessment/Changes in Function:   Pt presents with no reoccurrence of lumbar/LE pain/symptoms over the past week and was able to advance to increased mini band resistance with side steps with min to mod challenge today. Will continue to progress/advance patient within current POC as tolerated with monitoring symptoms. []  See Progress Note/Recertification   Patient will continue to benefit from skilled PT services to modify and progress therapeutic interventions, address functional mobility deficits, address ROM deficits, address strength deficits, analyze and address soft tissue restrictions, analyze and cue movement patterns, analyze and modify body mechanics/ergonomics, assess and modify postural abnormalities and instruct in home and community integration to attain remaining goals. Progress toward goals / Updated goals:  New Goals to be achieved in __6__  treatments:  1. Dec max pain </= 3/10 to improve activity tolerance 12/22/21: Met, Pt reports no reoccurrence of pain over the past week  2. Pt will improve L gastroc strength evident by ability to perform > 18 SL HR to full range to improve ease of prolonged walking and stair navigation. 3. Pt will report =/> 75% reduction in frequency and intensity of pain/symptoms with prolonged walking and initial activity after getting out of bed in the morning. PLAN  [x]  Upgrade activities as tolerated yes Continue plan of care   []  Discharge due to :    []  Other:      Therapist: Jamie Quinones PTA    Date: 12/22/2021 Time: 3:07 PM     No future appointments.

## 2021-12-27 ENCOUNTER — HOSPITAL ENCOUNTER (OUTPATIENT)
Dept: PHYSICAL THERAPY | Age: 46
Discharge: HOME | End: 2021-12-27
Payer: COMMERCIAL

## 2021-12-27 PROCEDURE — 97110 THERAPEUTIC EXERCISES: CPT

## 2021-12-27 PROCEDURE — 97140 MANUAL THERAPY 1/> REGIONS: CPT

## 2021-12-27 NOTE — PROGRESS NOTES
PHYSICAL THERAPY - DAILY TREATMENT NOTE    Patient Name: Esthela Rodarte        Date: 2021  : 1975   yes Patient  Verified  Visit #:   12   of   15  Insurance: Payor: Stefan Wynn / Plan: [address] PT / Product Type: Commerical /      In time: 1:03 Out time: 2:15   Total Treatment Time: 72     Medicare/[facility] Time Tracking (below)   Total Timed Codes (min):   1:1 Treatment Time:       TREATMENT AREA =  Other low back pain [M54.59]    SUBJECTIVE  Pain Level (on 0 to 10 scale):  0  / 10   Medication Changes/New allergies or changes in medical history, any new surgeries or procedures?    no  If yes, update Summary List   Subjective Functional Status/Changes:  []  No changes reported     My back and legs have been feeling really good since I was here last, I have even been doing my exercises daily, I even did them on Jihan along with some of the new ones that you have had me doing. OBJECTIVE    62 min Therapeutic Exercise:  [x]  See flow sheet   Rationale:      increase ROM, increase strength and increase proprioception to improve the patients ability to improve functional abilities      10 min Manual Therapy: STM/tissue mobs to bilateral lumbar quadrants L>R in prone    Rationale:      decrease pain, increase ROM, increase tissue extensibility, decrease trigger points and increase postural awareness to improve patient's ability to improve functional mobility   The manual therapy interventions were performed at a separate and distinct time from the therapeutic activities interventions. Billed With/As:   [] TE   [] TA   [] Neuro   [] Self Care Patient Education: [x] Review HEP    [] Progressed/Changed HEP based on:   [] positioning   [] body mechanics   [] transfers   [] heat/ice application    [] other:      Other Objective/Functional Measures:   Added STAR taps to program today     Post Treatment Pain Level (on 0 to 10) scale:   0  / 10     ASSESSMENT  Assessment/Changes in Function:   Pt presents with no new reoccurrence/increased in symptoms since last treatment and was able to advance to addition of STAR taps on foam with moderate challenge with LE endurance and proprioceptive/balance awareness today. Pt was also able to tolerate increased reps with bridges as well as level 2 dead bug stabs with min to mod challenge today. Will continue to progress/advance patient within current POC as tolerated with monitoring symptoms. []  See Progress Note/Recertification   Patient will continue to benefit from skilled PT services to modify and progress therapeutic interventions, address functional mobility deficits, address ROM deficits, address strength deficits, analyze and address soft tissue restrictions, analyze and cue movement patterns, analyze and modify body mechanics/ergonomics, assess and modify postural abnormalities and instruct in home and community integration to attain remaining goals. Progress toward goals / Updated goals:  New Goals to be achieved in __6__  treatments:  1. Dec max pain </= 3/10 to improve activity tolerance 12/22/21: Met, Pt reports no reoccurrence of pain over the past week  2. Pt will improve L gastroc strength evident by ability to perform > 18 SL HR to full range to improve ease of prolonged walking and stair navigation. 3. Pt will report =/> 75% reduction in frequency and intensity of pain/symptoms with prolonged walking and initial activity after getting out of bed in the morning.      PLAN  [x]  Upgrade activities as tolerated yes Continue plan of care   []  Discharge due to :    []  Other:      Therapist: Faustino Potter PTA    Date: 12/27/2021 Time: 1:10 PM     Future Appointments   Date Time Provider Michael Raymond   12/29/2021 10:15 AM Kelley Lunch, PTA [facility]   1/3/2022  1:45 PM Kelley Lunch, PTA [facility]   1/5/2022  2:15 PM Kelley Lunch, PTA [facility]   1/10/2022  1:45 PM Kelley Lunch, PTA [facility]   1/12/2022  2:15 PM Manolo Sloan Evan Bass [address] [facility]

## 2021-12-29 ENCOUNTER — HOSPITAL ENCOUNTER (OUTPATIENT)
Dept: PHYSICAL THERAPY | Age: 46
Discharge: HOME | End: 2021-12-29
Payer: COMMERCIAL

## 2021-12-29 PROCEDURE — 97110 THERAPEUTIC EXERCISES: CPT

## 2021-12-29 PROCEDURE — 97140 MANUAL THERAPY 1/> REGIONS: CPT

## 2021-12-29 NOTE — PROGRESS NOTES
PHYSICAL THERAPY - DAILY TREATMENT NOTE    Patient Name: Nick Flanagan        Date: 2021  : 1975   yes Patient  Verified  Visit #:   15   of   15  Insurance: Payor: Marvin Heaven / Plan: [address] PT / Product Type: Commerical /      In time: 10:16 Out time: 11:26   Total Treatment Time: 70     Medicare/[facility] Time Tracking (below)   Total Timed Codes (min):   1:1 Treatment Time:       TREATMENT AREA =  Other low back pain [M54.59]    SUBJECTIVE  Pain Level (on 0 to 10 scale):  0  / 10   Medication Changes/New allergies or changes in medical history, any new surgeries or procedures?    no  If yes, update Summary List   Subjective Functional Status/Changes:  []  No changes reported     My legs felt a little bit wobbly after the work out last time and I had an episode when I was at the playground with my grand kids and my equilibrium and balance felt off which concerned me. OBJECTIVE    60 min Therapeutic Exercise:  [x]  See flow sheet   Rationale:      increase ROM, increase strength and increase proprioception to improve the patients ability to improve functional abilities      10 min Manual Therapy: STM/tissue mobs to bilateral lumbar quadrants L>R    Rationale:      decrease pain, increase ROM, increase tissue extensibility, decrease trigger points and increase postural awareness to improve patient's ability to improve functional mobility   The manual therapy interventions were performed at a separate and distinct time from the therapeutic activities interventions. Billed With/As:   [] TE   [] TA   [] Neuro   [] Self Care Patient Education: [x] Review HEP    [] Progressed/Changed HEP based on:   [] positioning   [] body mechanics   [] transfers   [] heat/ice application    [] other:      Other Objective/Functional Measures:   Addition of lateral step ups and tandem stance on foam with eyes closed to program today     Post Treatment Pain Level (on 0 to 10) scale:   0 / 10 ASSESSMENT  Assessment/Changes in Function:   Patient reports approximately 92% overall improvement from therapy since initial evaluation. However, pt presented with new c/o 1 episode of imbalance since last treatment. Pt was challenged with balance/proprioceptive awareness with addition of tandem stance on foam with eyes closed and was also able to advance to addition of lateral step ups with min to mod challenge today. Will continue to progress/advance patient within current POC as tolerated with monitoring symptoms. []  See Progress Note/Recertification   Patient will continue to benefit from skilled PT services to modify and progress therapeutic interventions, address functional mobility deficits, address ROM deficits, address strength deficits, analyze and address soft tissue restrictions, analyze and cue movement patterns, analyze and modify body mechanics/ergonomics, assess and modify postural abnormalities, address imbalance/dizziness and instruct in home and community integration to attain remaining goals. Progress toward goals / Updated goals:  New Goals to be achieved in __6__  treatments:  1. Dec max pain </= 3/10 to improve activity tolerance 12/22/21: Met, Pt reports no reoccurrence of pain over the past week  2. Pt will improve L gastroc strength evident by ability to perform > 18 SL HR to full range to improve ease of prolonged walking and stair navigation. 3. Pt will report =/> 75% reduction in frequency and intensity of pain/symptoms with prolonged walking and initial activity after getting out of bed in the morning. 12/29/21: Met, Pt reports approximately 95% reduction in frequency and intensity of pain/symptoms with prolonged walking and initial activity after getting out of bed in the morning     PLAN  [x]  Upgrade activities as tolerated yes Continue plan of care   []  Discharge due to :    []  Other:      Therapist: Yemi Garzon PTA    Date: 12/29/2021 Time: 10:14 AM     Future Appointments   Date Time Provider Michael Mandi   12/29/2021 10:15 AM Delayne Lizy, PTA [facility]   1/3/2022  1:45 PM Delayne Lizy, PTA [facility]   1/5/2022  2:15 PM Delayne Lizy, PTA [facility]   1/10/2022  1:45 PM Delayne Lizy, PTA [facility]   1/12/2022  2:15 PM Delayne Lizy, PTA [facility]

## 2022-01-03 ENCOUNTER — HOSPITAL ENCOUNTER (OUTPATIENT)
Dept: PHYSICAL THERAPY | Age: 47
Discharge: HOME | End: 2022-01-03
Payer: COMMERCIAL

## 2022-01-03 PROCEDURE — 97140 MANUAL THERAPY 1/> REGIONS: CPT

## 2022-01-03 PROCEDURE — 97110 THERAPEUTIC EXERCISES: CPT

## 2022-01-03 NOTE — PROGRESS NOTES
PHYSICAL THERAPY - DAILY TREATMENT NOTE    Patient Name: Yue Bolton        Date: 1/3/2022  : 1975   yes Patient  Verified  Visit #:   15   of   15  Insurance: Payor: [place] / Plan: [facility] PT / Product Type: Commerical /      In time: 1:46 Out time: 2:55   Total Treatment Time: 69     Medicare/[facility] Time Tracking (below)   Total Timed Codes (min):   1:1 Treatment Time:       TREATMENT AREA =  Other low back pain [M54.59]    SUBJECTIVE  Pain Level (on 0 to 10 scale):  2  / 10   Medication Changes/New allergies or changes in medical history, any new surgeries or procedures?    no  If yes, update Summary List   Subjective Functional Status/Changes:  []  No changes reported     I have been doing that balance exercise at home standing on a pillow with my eyes closed at home like you had me do last time and my daughter was laughing at me, so I think that I need some work on my balance. OBJECTIVE    59 min Therapeutic Exercise:  [x]  See flow sheet   Rationale:      increase ROM, increase strength and increase proprioception to improve the patients ability to improve functional abilities      10 min Manual Therapy: STM/tissue mobs to bilateral lumbar quadrants L>R   Rationale:      decrease pain, increase ROM, increase tissue extensibility, decrease trigger points and increase postural awareness to improve patient's ability to improve functional mobility   The manual therapy interventions were performed at a separate and distinct time from the therapeutic activities interventions.       Billed With/As:   [] TE   [] TA   [] Neuro   [] Self Care Patient Education: [x] Review HEP    [] Progressed/Changed HEP based on:   [] positioning   [] body mechanics   [] transfers   [] heat/ice application    [] other:      Other Objective/Functional Measures:  Reviewed progress/prognosis and discharge planning      Post Treatment Pain Level (on 0 to 10) scale:   0  / 10 ASSESSMENT  Assessment/Changes in Function:   Pt reports the only remaining deficit with decreased proprioceptive/balance especially with eyes closed. However, Pt is near maximum medical benefit from current POC and will be appropriate for discharge next treatment. Will review detailed progress/LTGs for planned discharge next visit. []  See Progress Note/Recertification   Patient will continue to benefit from skilled PT services to modify and progress therapeutic interventions, address functional mobility deficits, address ROM deficits, address strength deficits, analyze and address soft tissue restrictions, analyze and cue movement patterns, analyze and modify body mechanics/ergonomics, assess and modify postural abnormalities and instruct in home and community integration to attain remaining goals. Progress toward goals / Updated goals:  New Goals to be achieved in __6__  treatments:  1. Dec max pain </= 3/10 to improve activity tolerance 12/22/21: Met, Pt reports no reoccurrence of pain over the past week  2. Pt will improve L gastroc strength evident by ability to perform > 18 SL HR to full range to improve ease of prolonged walking and stair navigation. 1/3/22: Met, with ability to perform 2 sets of 10 single leg heel raises with good form   3. Pt will report =/> 75% reduction in frequency and intensity of pain/symptoms with prolonged walking and initial activity after getting out of bed in the morning. 12/29/21: Met, Pt reports approximately 95% reduction in frequency and intensity of pain/symptoms with prolonged walking and initial activity after getting out of bed in the morning     PLAN  [x]  Upgrade activities as tolerated yes Continue plan of care   []  Discharge due to :    []  Other:      Therapist: Kelly Holman PTA    Date: 1/3/2022 Time: 1:56 PM     Future Appointments   Date Time Provider Michael Raymond   1/10/2022  1:45 PM Mitzi Varela [facility]   1/12/2022  2:15 PM Diego Kam ROSALVA [facility]

## 2022-01-05 ENCOUNTER — APPOINTMENT (OUTPATIENT)
Dept: PHYSICAL THERAPY | Age: 47
End: 2022-01-05
Payer: COMMERCIAL

## 2022-01-10 ENCOUNTER — APPOINTMENT (OUTPATIENT)
Dept: PHYSICAL THERAPY | Age: 47
End: 2022-01-10
Payer: COMMERCIAL

## 2022-01-12 ENCOUNTER — APPOINTMENT (OUTPATIENT)
Dept: PHYSICAL THERAPY | Age: 47
End: 2022-01-12
Payer: COMMERCIAL

## 2022-01-18 ENCOUNTER — HOSPITAL ENCOUNTER (OUTPATIENT)
Dept: PHYSICAL THERAPY | Age: 47
Discharge: HOME | End: 2022-01-18
Payer: COMMERCIAL

## 2022-01-18 PROCEDURE — 97530 THERAPEUTIC ACTIVITIES: CPT

## 2022-01-18 PROCEDURE — 97110 THERAPEUTIC EXERCISES: CPT

## 2022-01-18 NOTE — PROGRESS NOTES
[facility] PHYSICAL THERAPY  [facility] [address], [place], [address] - Phone: [phone]  Fax: [phone]  DISCHARGE SUMMARY  Patient Name: Ashok Cantu : 1975   Treatment/Medical Diagnosis: Other low back pain [M54.59]   Referral Source: Alicia Guthrie MD     Date of Initial Visit: 21 Attended Visits: 15 Missed Visits: 5     SUMMARY OF TREATMENT  Therapeutic exercise for lumbar/LE flexibility, postural awareness/strengthening, lumbopelvic/core stabilization, manual intervention, electrical stimulation with cryotherapy and HEP. CURRENT STATUS  Pt reports 99% overall improvement in sx since start of care, with no reoccurrence of increased pain in > 2-3 weeks. Pt has made excellent progress with gaining lumbar and LE mobility/flexibility as well advancing with strengthening and lumbopelvic/core stabilization within current POC. Pt was given and instructed in an updated HEP for continued self maintenance of reduced symptoms after D/C from therapy. Improvements: Pt reports the most functional improvement with increased mobility as well as improved ambulation tolerance/endurance with ADL's. Remaining Functional limitations: Pt reports only remaining functional limitation with slightly decreased proprioceptive/balance awareness with ADL's. Objective:   Lumbar AROM= Flexion= 70% (finger tips to distal shin); Extension= 70%; Side bending= full/WFL's bilaterally   L LE strength measurements/MMT= Quads= 5/5; Hamstrings= 4+/5; Gastrocnemius= 4/5  FOTO 79/100  Goal/Measure of Progress Goal Met? 1. Dec max pain </= 3/10 to improve activity tolerance   Status at last Eval: 5/10 Current Status: no reoccurrence of increased pain in > 2-3 weeks yes   2. Pt will improve L gastroc strength evident by ability to perform > 18 SL HR to full range to improve ease of prolonged walking and stair navigation.    Status at last Eval: Gastrocnemius= 4/5 with ability to perform 15 SL heel raises  Current Status: Met with ability to perform 18 SL heel raises yes   3. Pt will report =/> 75% reduction in frequency and intensity of pain/symptoms with prolonged walking and initial activity after getting out of bed in the morning. Status at last Eval: New goal established last assessment  Current Status: Met, all pain/symptoms abolished over the past 2-3 weeks yes     RECOMMENDATIONS  Patient has achieved maximum medical benefit/potential from current POC after completing 15 of 15 prescribed visits and is ready for discharge from therapy at this time. If you have any questions/comments please contact us directly at [phone]. Thank you for allowing us to assist in the care of your patient.     Therapist Signature: Gabrielle Ortiz PTA Date: 1/18/22     Time: 9:35 AM

## 2022-01-18 NOTE — PROGRESS NOTES
Physical Therapy Discharge Instructions  [address]  [address], [place] [address]  P: [phone] F: [phone]    Patient: Sunny Fay  : 1975    Reason for Discharge From PT:  [x]Met/progressing towards all set goals    []Minimal progress made towards set goals    []Met a plateau in progress/improvement    []Insurance/financial issues    []Other:     Recommendations:   [x] Return to activity with home program as prescribed on print-outs    [] Return to activity with the following modifications:     [] [employer] fitness training     [] Return to/join local gym    [] Other:      Continue with      [] Ice [x] Heat   as needed for 10-15 minutes to relieve pain  *If pain does not improve after several days, follow-up with your physician for a consult*           Follow up with MD:     [x] Upon completion of therapy   [] As needed      Additional Comments: Great Job! It was nice work working! Thank you for choosing [facility] [place] for your PT needs!       Alexey Price, ROSALVA 2022 9:59 AM

## 2022-01-18 NOTE — PROGRESS NOTES
PHYSICAL THERAPY - DAILY TREATMENT NOTE    Patient Name: Camila Craft        Date: 2022  : 1975   yes Patient  Verified  Visit #:   13   of   15  Insurance: Payor: Lory Peters / Plan: [address] PT / Product Type: Commerical /      In time: 9:15 Out time: 10:19   Total Treatment Time: 64     Medicare/[facility] Time Tracking (below)   Total Timed Codes (min):   1:1 Treatment Time:       TREATMENT AREA =  Other low back pain [M54.59]    SUBJECTIVE  Pain Level (on 0 to 10 scale):  0  / 10   Medication Changes/New allergies or changes in medical history, any new surgeries or procedures?    no  If yes, update Summary List   Subjective Functional Status/Changes:  []  No changes reported     My back and legs have been feeling really good, I even went to the batting cage as well as doing an indoor jog at the gym and I felt fine afterwards, no real issues to speak of. OBJECTIVE    49 min Therapeutic Exercise:  [x]  See flow sheet   Rationale:      increase ROM, increase strength, improve balance and increase proprioception to improve the patients ability to improve functional abilities      15 min Therapeutic Activity: [x]  See flow sheet   Rationale: Through reassessment of all established goals including mobility and strength reassessment for discharge summary     Billed With/As:   [] TE   [] TA   [] Neuro   [] Self Care Patient Education: [x] Review HEP    [] Progressed/Changed HEP based on:   [] positioning   [] body mechanics   [] transfers   [] heat/ice application    [] other:      Other Objective/Functional Measures:       Post Treatment Pain Level (on 0 to 10) scale:   0  / 10     ASSESSMENT  Assessment/Changes in Function:   See discharge summary for full final detailed progress towards all established goals. [x]  See Progress Note/Recertification      Progress toward goals / Updated goals:  See discharge summary for full final detailed progress towards all established goals. PLAN  [x]  Upgrade activities as tolerated no Continue plan of care   []  Discharge due to :    []  Other:      Therapist: Maxwell Almonte PTA    Date: 1/18/2022 Time: 9:29 AM     Future Appointments   Date Time Provider Michael Raymond   1/20/2022  9:15 AM Evon Silva PTA [facility]

## 2022-01-20 ENCOUNTER — APPOINTMENT (OUTPATIENT)
Dept: PHYSICAL THERAPY | Age: 47
End: 2022-01-20
Payer: COMMERCIAL